# Patient Record
Sex: FEMALE | Race: WHITE | NOT HISPANIC OR LATINO | Employment: FULL TIME | URBAN - METROPOLITAN AREA
[De-identification: names, ages, dates, MRNs, and addresses within clinical notes are randomized per-mention and may not be internally consistent; named-entity substitution may affect disease eponyms.]

---

## 2022-01-18 ENCOUNTER — EMPLOYEE HEALTH (OUTPATIENT)
Dept: OCCUPATIONAL MEDICINE | Facility: CLINIC | Age: 27
End: 2022-01-18

## 2022-01-18 ENCOUNTER — EH NON-PROVIDER (OUTPATIENT)
Dept: OCCUPATIONAL MEDICINE | Facility: CLINIC | Age: 27
End: 2022-01-18

## 2022-01-18 DIAGNOSIS — Z02.89 VISIT FOR OCCUPATIONAL HEALTH EXAMINATION: Primary | ICD-10-CM

## 2022-01-18 DIAGNOSIS — Z02.1 PHYSICAL EXAM, PRE-EMPLOYMENT: ICD-10-CM

## 2022-01-18 LAB
AMP AMPHETAMINE: NORMAL
BAR BARBITURATES: NORMAL
BZO BENZODIAZEPINES: NORMAL
COC COCAINE: NORMAL
INT CON NEG: NORMAL
INT CON POS: NORMAL
MDMA ECSTASY: NORMAL
MET METHAMPHETAMINES: NORMAL
MTD METHADONE: NORMAL
OPI OPIATES: NORMAL
OXY OXYCODONE: NORMAL
PCP PHENCYCLIDINE: NORMAL
POC URINE DRUG SCREEN OCDRS: NORMAL
THC: NORMAL

## 2022-01-18 PROCEDURE — 80305 DRUG TEST PRSMV DIR OPT OBS: CPT | Performed by: PREVENTIVE MEDICINE

## 2022-01-18 PROCEDURE — 94375 RESPIRATORY FLOW VOLUME LOOP: CPT | Performed by: PREVENTIVE MEDICINE

## 2022-01-18 PROCEDURE — 86787 VARICELLA-ZOSTER ANTIBODY: CPT | Performed by: PREVENTIVE MEDICINE

## 2022-01-18 PROCEDURE — 8915 PR COMPREHENSIVE PHYSICAL: Performed by: PREVENTIVE MEDICINE

## 2022-01-18 PROCEDURE — 90715 TDAP VACCINE 7 YRS/> IM: CPT | Performed by: NURSE PRACTITIONER

## 2022-01-18 PROCEDURE — 86480 TB TEST CELL IMMUN MEASURE: CPT | Performed by: PREVENTIVE MEDICINE

## 2022-01-18 PROCEDURE — 90686 IIV4 VACC NO PRSV 0.5 ML IM: CPT | Performed by: NURSE PRACTITIONER

## 2022-01-20 ENCOUNTER — EH NON-PROVIDER (OUTPATIENT)
Dept: OCCUPATIONAL MEDICINE | Facility: CLINIC | Age: 27
End: 2022-01-20

## 2022-01-20 ENCOUNTER — HOSPITAL ENCOUNTER (OUTPATIENT)
Facility: MEDICAL CENTER | Age: 27
End: 2022-01-20
Attending: NURSE PRACTITIONER
Payer: COMMERCIAL

## 2022-01-20 DIAGNOSIS — Z02.89 ENCOUNTER FOR OCCUPATIONAL HEALTH EXAMINATION: ICD-10-CM

## 2022-01-20 DIAGNOSIS — Z02.89 ENCOUNTER FOR OCCUPATIONAL HEALTH EXAMINATION: Primary | ICD-10-CM

## 2022-01-20 LAB — VZV IGG SER IA-ACNC: 2.07

## 2022-01-20 PROCEDURE — 86480 TB TEST CELL IMMUN MEASURE: CPT | Performed by: NURSE PRACTITIONER

## 2022-01-20 PROCEDURE — 86787 VARICELLA-ZOSTER ANTIBODY: CPT | Performed by: NURSE PRACTITIONER

## 2022-01-21 LAB
GAMMA INTERFERON BACKGROUND BLD IA-ACNC: 0.02 IU/ML
M TB IFN-G BLD-IMP: NEGATIVE
M TB IFN-G CD4+ BCKGRND COR BLD-ACNC: 0 IU/ML
MITOGEN IGNF BCKGRD COR BLD-ACNC: 4.76 IU/ML
QFT TB2 - NIL TBQ2: 0 IU/ML

## 2022-03-04 ENCOUNTER — EH NON-PROVIDER (OUTPATIENT)
Dept: OCCUPATIONAL MEDICINE | Facility: CLINIC | Age: 27
End: 2022-03-04
Payer: COMMERCIAL

## 2022-03-09 ENCOUNTER — OFFICE VISIT (OUTPATIENT)
Dept: URGENT CARE | Facility: CLINIC | Age: 27
End: 2022-03-09
Payer: COMMERCIAL

## 2022-03-09 VITALS
SYSTOLIC BLOOD PRESSURE: 110 MMHG | RESPIRATION RATE: 16 BRPM | OXYGEN SATURATION: 98 % | BODY MASS INDEX: 21.89 KG/M2 | TEMPERATURE: 98.8 F | DIASTOLIC BLOOD PRESSURE: 70 MMHG | WEIGHT: 147.8 LBS | HEART RATE: 89 BPM | HEIGHT: 69 IN

## 2022-03-09 DIAGNOSIS — H66.003 NON-RECURRENT ACUTE SUPPURATIVE OTITIS MEDIA OF BOTH EARS WITHOUT SPONTANEOUS RUPTURE OF TYMPANIC MEMBRANES: ICD-10-CM

## 2022-03-09 PROCEDURE — 99203 OFFICE O/P NEW LOW 30 MIN: CPT | Performed by: NURSE PRACTITIONER

## 2022-03-09 RX ORDER — AMOXICILLIN AND CLAVULANATE POTASSIUM 875; 125 MG/1; MG/1
1 TABLET, FILM COATED ORAL 2 TIMES DAILY
Qty: 10 TABLET | Refills: 0 | Status: SHIPPED | OUTPATIENT
Start: 2022-03-09 | End: 2022-03-14

## 2022-03-10 ASSESSMENT — ENCOUNTER SYMPTOMS
DIZZINESS: 0
FEVER: 0
SORE THROAT: 0
RHINORRHEA: 1
MYALGIAS: 0
CHILLS: 0
HEADACHES: 0
EYE PAIN: 0
COUGH: 1
NAUSEA: 0
SHORTNESS OF BREATH: 0
VOMITING: 0

## 2022-03-10 NOTE — PROGRESS NOTES
"Subjective:   Jennifer Lema is a 26 y.o. female who presents for Otalgia (Both sides,  x1 week )      Otalgia   There is pain in both ears. This is a new problem. The current episode started in the past 7 days. The problem occurs constantly. The problem has been gradually worsening. There has been no fever. The pain is at a severity of 7/10. The pain is moderate. Associated symptoms include coughing and rhinorrhea. Pertinent negatives include no ear discharge, headaches, hearing loss, rash, sore throat or vomiting. The treatment provided no relief. There is no history of a chronic ear infection, hearing loss or a tympanostomy tube.       Review of Systems   Constitutional: Negative for chills and fever.   HENT: Positive for ear pain and rhinorrhea. Negative for ear discharge, hearing loss and sore throat.    Eyes: Negative for pain.   Respiratory: Positive for cough. Negative for shortness of breath.    Cardiovascular: Negative for chest pain.   Gastrointestinal: Negative for nausea and vomiting.   Genitourinary: Negative for hematuria.   Musculoskeletal: Negative for myalgias.   Skin: Negative for rash.   Neurological: Negative for dizziness and headaches.       Medications:    • amoxicillin-clavulanate Tabs    Allergies: Sulfa drugs    Problem List: Jennifer Lema does not have a problem list on file.    Surgical History:  No past surgical history on file.    Past Social Hx: Jennifer Lema       Past Family Hx:  Jennifer Lema family history is not on file.     Problem list, medications, and allergies reviewed by myself today in Epic.     Objective:     /70 (BP Location: Right arm, Patient Position: Sitting, BP Cuff Size: Adult long)   Pulse 89   Temp 37.1 °C (98.8 °F) (Temporal)   Resp 16   Ht 1.753 m (5' 9\")   Wt 67 kg (147 lb 12.8 oz)   SpO2 98%   BMI 21.83 kg/m²     Physical Exam  Vitals and nursing note reviewed.   Constitutional:       General: She is not in acute distress.     " Appearance: She is well-developed.   HENT:      Head: Normocephalic and atraumatic.      Right Ear: External ear normal. Tympanic membrane is erythematous and bulging.      Left Ear: External ear normal. Tympanic membrane is erythematous and bulging.      Nose: Nose normal.      Right Sinus: No maxillary sinus tenderness or frontal sinus tenderness.      Left Sinus: No maxillary sinus tenderness or frontal sinus tenderness.      Mouth/Throat:      Mouth: Mucous membranes are moist.      Pharynx: Uvula midline. No posterior oropharyngeal erythema.      Tonsils: No tonsillar exudate or tonsillar abscesses.   Eyes:      General:         Right eye: No discharge.         Left eye: No discharge.      Conjunctiva/sclera: Conjunctivae normal.   Cardiovascular:      Rate and Rhythm: Normal rate.   Pulmonary:      Effort: Pulmonary effort is normal. No respiratory distress.      Breath sounds: Normal breath sounds.   Abdominal:      General: There is no distension.   Musculoskeletal:         General: Normal range of motion.   Skin:     General: Skin is warm and dry.   Neurological:      General: No focal deficit present.      Mental Status: She is alert and oriented to person, place, and time. Mental status is at baseline.      Gait: Gait (gait at baseline) normal.   Psychiatric:         Judgment: Judgment normal.         Assessment/Plan:     Diagnosis and associated orders:     1. Non-recurrent acute suppurative otitis media of both ears without spontaneous rupture of tympanic membranes  amoxicillin-clavulanate (AUGMENTIN) 875-125 MG Tab      Comments/MDM:     • Advised to continue supportive care with Tylenol and/or ibuprofen for fevers and discomfort. Increased fluids and electrolytes.  • Differential diagnosis, natural history, supportive care, and indications for immediate follow-up discussed.                Please note that this dictation was created using voice recognition software. I have made a reasonable attempt to  correct obvious errors, but I expect that there are errors of grammar and possibly content that I did not discover before finalizing the note.    This note was electronically signed by Conro JURADO.

## 2022-04-11 ENCOUNTER — OFFICE VISIT (OUTPATIENT)
Dept: URGENT CARE | Facility: CLINIC | Age: 27
End: 2022-04-11
Payer: COMMERCIAL

## 2022-04-11 VITALS
RESPIRATION RATE: 16 BRPM | DIASTOLIC BLOOD PRESSURE: 66 MMHG | BODY MASS INDEX: 21.18 KG/M2 | TEMPERATURE: 97.2 F | SYSTOLIC BLOOD PRESSURE: 128 MMHG | WEIGHT: 143 LBS | HEIGHT: 69 IN | OXYGEN SATURATION: 98 % | HEART RATE: 73 BPM

## 2022-04-11 DIAGNOSIS — H69.93 DYSFUNCTION OF BOTH EUSTACHIAN TUBES: ICD-10-CM

## 2022-04-11 PROCEDURE — 99213 OFFICE O/P EST LOW 20 MIN: CPT | Performed by: PHYSICIAN ASSISTANT

## 2022-04-11 RX ORDER — FLUTICASONE PROPIONATE 50 MCG
1 SPRAY, SUSPENSION (ML) NASAL DAILY
Qty: 15.8 ML | Refills: 0 | Status: SHIPPED | OUTPATIENT
Start: 2022-04-11

## 2022-04-11 RX ORDER — CETIRIZINE HYDROCHLORIDE 10 MG/1
10 TABLET ORAL DAILY
Qty: 30 TABLET | Refills: 0 | Status: SHIPPED | OUTPATIENT
Start: 2022-04-11

## 2022-04-11 NOTE — PATIENT INSTRUCTIONS
Eustachian Tube Dysfunction    Eustachian tube dysfunction refers to a condition in which a blockage develops in the narrow passage that connects the middle ear to the back of the nose (eustachian tube). The eustachian tube regulates air pressure in the middle ear by letting air move between the ear and nose. It also helps to drain fluid from the middle ear space.  Eustachian tube dysfunction can affect one or both ears. When the eustachian tube does not function properly, air pressure, fluid, or both can build up in the middle ear.  What are the causes?  This condition occurs when the eustachian tube becomes blocked or cannot open normally. Common causes of this condition include:  · Ear infections.  · Colds and other infections that affect the nose, mouth, and throat (upper respiratory tract).  · Allergies.  · Irritation from cigarette smoke.  · Irritation from stomach acid coming up into the esophagus (gastroesophageal reflux). The esophagus is the tube that carries food from the mouth to the stomach.  · Sudden changes in air pressure, such as from descending in an airplane or scuba diving.  · Abnormal growths in the nose or throat, such as:  ? Growths that line the nose (nasal polyps).  ? Abnormal growth of cells (tumors).  ? Enlarged tissue at the back of the throat (adenoids).  What increases the risk?  You are more likely to develop this condition if:  · You smoke.  · You are overweight.  · You are a child who has:  ? Certain birth defects of the mouth, such as cleft palate.  ? Large tonsils or adenoids.  What are the signs or symptoms?  Common symptoms of this condition include:  · A feeling of fullness in the ear.  · Ear pain.  · Clicking or popping noises in the ear.  · Ringing in the ear.  · Hearing loss.  · Loss of balance.  · Dizziness.  Symptoms may get worse when the air pressure around you changes, such as when you travel to an area of high elevation, fly on an airplane, or go scuba diving.  How is  "this diagnosed?  This condition may be diagnosed based on:  · Your symptoms.  · A physical exam of your ears, nose, and throat.  · Tests, such as those that measure:  ? The movement of your eardrum (tympanogram).  ? Your hearing (audiometry).  How is this treated?  Treatment depends on the cause and severity of your condition.  · In mild cases, you may relieve your symptoms by moving air into your ears. This is called \"popping the ears.\"  · In more severe cases, or if you have symptoms of fluid in your ears, treatment may include:  ? Medicines to relieve congestion (decongestants).  ? Medicines that treat allergies (antihistamines).  ? Nasal sprays or ear drops that contain medicines that reduce swelling (steroids).  ? A procedure to drain the fluid in your eardrum (myringotomy). In this procedure, a small tube is placed in the eardrum to:  § Drain the fluid.  § Restore the air in the middle ear space.  ? A procedure to insert a balloon device through the nose to inflate the opening of the eustachian tube (balloon dilation).  Follow these instructions at home:  Lifestyle  · Do not do any of the following until your health care provider approves:  ? Travel to high altitudes.  ? Fly in airplanes.  ? Work in a pressurized cabin or room.  ? Scuba dive.  · Do not use any products that contain nicotine or tobacco, such as cigarettes and e-cigarettes. If you need help quitting, ask your health care provider.  · Keep your ears dry. Wear fitted earplugs during showering and bathing. Dry your ears completely after.  General instructions  · Take over-the-counter and prescription medicines only as told by your health care provider.  · Use techniques to help pop your ears as recommended by your health care provider. These may include:  ? Chewing gum.  ? Yawning.  ? Frequent, forceful swallowing.  ? Closing your mouth, holding your nose closed, and gently blowing as if you are trying to blow air out of your nose.  · Keep all " follow-up visits as told by your health care provider. This is important.  Contact a health care provider if:  · Your symptoms do not go away after treatment.  · Your symptoms come back after treatment.  · You are unable to pop your ears.  · You have:  ? A fever.  ? Pain in your ear.  ? Pain in your head or neck.  ? Fluid draining from your ear.  · Your hearing suddenly changes.  · You become very dizzy.  · You lose your balance.  Summary  · Eustachian tube dysfunction refers to a condition in which a blockage develops in the eustachian tube.  · It can be caused by ear infections, allergies, inhaled irritants, or abnormal growths in the nose or throat.  · Symptoms include ear pain, hearing loss, or ringing in the ears.  · Mild cases are treated with maneuvers to unblock the ears, such as yawning or ear popping.  · Severe cases are treated with medicines. Surgery may also be done (rare).  This information is not intended to replace advice given to you by your health care provider. Make sure you discuss any questions you have with your health care provider.  Document Released: 01/13/2017 Document Revised: 04/09/2019 Document Reviewed: 04/09/2019  [employer] Patient Education © 2020 [employer]

## 2022-04-12 PROBLEM — A60.00 GENITAL HERPES SIMPLEX: Status: ACTIVE | Noted: 2021-03-09

## 2022-04-12 PROBLEM — F41.1 GENERALIZED ANXIETY DISORDER: Status: ACTIVE | Noted: 2021-01-18

## 2022-04-12 PROBLEM — K90.0 CELIAC DISEASE: Status: ACTIVE | Noted: 2021-01-29

## 2022-04-12 PROBLEM — E55.9 VITAMIN D DEFICIENCY: Status: ACTIVE | Noted: 2022-04-12

## 2022-04-12 RX ORDER — CITALOPRAM 20 MG/1
TABLET ORAL
COMMUNITY

## 2022-04-12 RX ORDER — BUTALBITAL, ASPIRIN, AND CAFFEINE 325; 50; 40 MG/1; MG/1; MG/1
CAPSULE ORAL
COMMUNITY

## 2022-04-12 ASSESSMENT — ENCOUNTER SYMPTOMS
HEADACHES: 0
CHILLS: 0
DIZZINESS: 0
SHORTNESS OF BREATH: 0
VOMITING: 0
NAUSEA: 0
COUGH: 0
FEVER: 0
SORE THROAT: 0

## 2022-04-12 NOTE — PROGRESS NOTES
Subjective     Jennifer Lema is a 26 y.o. female who presents with Follow-Up (Ear pain, was diagnosed ear infection, right ear hurts more x 2 months)    HPI:  Jennifer Lema is a 26 y.o. female who presents today for evaluation of ear pain. Patient was initially seen in the urgent care on 3/9/2022 for evaluation of bilateral ear pain.  She was diagnosed with acute suppurative otitis media of both ears and was placed on Augmentin.  Patient reports that after taking antibiotics symptoms diminished slightly but have never gone away and she has continued to have discomfort in both ears since that time.  No discharge from the ears.  No fever/chills.  No dizziness.      Review of Systems   Constitutional: Negative for chills and fever.   HENT: Positive for ear pain and hearing loss (muffled hearing). Negative for sore throat and tinnitus.    Respiratory: Negative for cough and shortness of breath.    Gastrointestinal: Negative for nausea and vomiting.   Neurological: Negative for dizziness and headaches.         PMH:  has no past medical history on file.  MEDS:   Current Outpatient Medications:   •  cetirizine (ZYRTEC) 10 MG Tab, Take 1 Tablet by mouth every day., Disp: 30 Tablet, Rfl: 0  •  fluticasone (FLONASE) 50 MCG/ACT nasal spray, Administer 1 Spray into affected nostril(S) every day., Disp: 15.8 mL, Rfl: 0  •  citalopram (CELEXA) 20 MG Tab, citalopram 20 mg tablet  TAKE 1 TABLET BY MOUTH ONCE DAILY, Disp: , Rfl:   •  ASPIRIN-CAFFEINE-BUTALBITAL (FIORINAL) -40 MG Cap, Fiorinal 50 mg-325 mg-40 mg capsule  Take 1 capsule every 4 hours by oral route as needed., Disp: , Rfl:   •  Multiple Vitamin (MULTIVITAMIN ADULT PO), multivitamin  take 1 tab qd, Disp: , Rfl:   •  CHOLECALCIFEROL PO, cholecalciferol (vitamin D3), Disp: , Rfl:   ALLERGIES:   Allergies   Allergen Reactions   • Sulfa Drugs Hives     SURGHX: No past surgical history on file.  SOCHX:  reports that she has never smoked. She has never used  "smokeless tobacco. She reports that she does not drink alcohol and does not use drugs.  FH: Family history was reviewed, no pertinent findings to report      Objective     /66 (BP Location: Right arm, Patient Position: Sitting, BP Cuff Size: Adult)   Pulse 73   Temp 36.2 °C (97.2 °F) (Temporal)   Resp 16   Ht 1.753 m (5' 9\")   Wt 64.9 kg (143 lb)   SpO2 98%   BMI 21.12 kg/m²      Physical Exam  Constitutional:       Appearance: She is well-developed.   HENT:      Head: Normocephalic and atraumatic.      Right Ear: Ear canal and external ear normal. A middle ear effusion is present. Tympanic membrane is bulging. Tympanic membrane is not perforated or erythematous.      Left Ear: Ear canal and external ear normal. A middle ear effusion is present. Tympanic membrane is bulging. Tympanic membrane is not perforated or erythematous.   Eyes:      Conjunctiva/sclera: Conjunctivae normal.      Pupils: Pupils are equal, round, and reactive to light.   Cardiovascular:      Rate and Rhythm: Normal rate and regular rhythm.      Heart sounds: Normal heart sounds. No murmur heard.  Pulmonary:      Effort: Pulmonary effort is normal.      Breath sounds: Normal breath sounds. No wheezing.   Musculoskeletal:      Cervical back: Normal range of motion.   Lymphadenopathy:      Cervical: No cervical adenopathy.   Skin:     General: Skin is warm and dry.      Capillary Refill: Capillary refill takes less than 2 seconds.   Neurological:      Mental Status: She is alert and oriented to person, place, and time.   Psychiatric:         Behavior: Behavior normal.         Judgment: Judgment normal.           Assessment & Plan     1. Dysfunction of both eustachian tubes  - cetirizine (ZYRTEC) 10 MG Tab; Take 1 Tablet by mouth every day.  Dispense: 30 Tablet; Refill: 0  - fluticasone (FLONASE) 50 MCG/ACT nasal spray; Administer 1 Spray into affected nostril(S) every day.  Dispense: 15.8 mL; Refill: 0  Ear infection seems to be " clearing up patient seems to have eustachian tube dysfunction with middle ear effusions.  Prescribed cetirizine and fluticasone.  Also recommend that she use an oral decongestant with Sudafed.  Also recommend use of saline nasal rinses, steam inhalation, and the use of a cool-mist humidifier in the bedroom at night.  If she is doing all of these things and has no improvement after 2 weeks she should return to the urgent care for reevaluation or see her primary care provider.              Differential Diagnosis, natural history, and supportive care discussed. Return to the Urgent Care or follow up with your PCP if symptoms fail to resolve, or for any new or worsening symptoms. Emergency room precautions discussed. Patient and/or family appears understanding of information.